# Patient Record
Sex: MALE | Race: WHITE | NOT HISPANIC OR LATINO | Employment: STUDENT | URBAN - METROPOLITAN AREA
[De-identification: names, ages, dates, MRNs, and addresses within clinical notes are randomized per-mention and may not be internally consistent; named-entity substitution may affect disease eponyms.]

---

## 2023-01-03 ENCOUNTER — CONSULT (OUTPATIENT)
Dept: GASTROENTEROLOGY | Facility: CLINIC | Age: 18
End: 2023-01-03

## 2023-01-03 ENCOUNTER — TELEPHONE (OUTPATIENT)
Dept: GASTROENTEROLOGY | Facility: CLINIC | Age: 18
End: 2023-01-03

## 2023-01-03 VITALS
HEIGHT: 71 IN | WEIGHT: 152.6 LBS | BODY MASS INDEX: 21.36 KG/M2 | TEMPERATURE: 98 F | SYSTOLIC BLOOD PRESSURE: 118 MMHG | DIASTOLIC BLOOD PRESSURE: 74 MMHG

## 2023-01-03 DIAGNOSIS — K21.9 GASTROESOPHAGEAL REFLUX DISEASE WITHOUT ESOPHAGITIS: Primary | ICD-10-CM

## 2023-01-03 DIAGNOSIS — R11.0 NAUSEA: ICD-10-CM

## 2023-01-03 DIAGNOSIS — R19.8 FULLNESS OF ABDOMEN: ICD-10-CM

## 2023-01-03 RX ORDER — CETIRIZINE HYDROCHLORIDE 10 MG/1
10 TABLET ORAL DAILY
COMMUNITY

## 2023-01-03 NOTE — PATIENT INSTRUCTIONS
Scheduled date of EGD(as of today):01/06/2023  Physician performing EGD:Dr Zenon Bird  Location of EGD:[facility]  Instructions reviewed with patient by:Jayla  Clearances:  N/A

## 2023-01-03 NOTE — H&P (VIEW-ONLY)
[facility] - Outpatient Consultation  Daryn Maier 21 y o  male MRN: [id number]  Encounter: [id number]          ASSESSMENT AND PLAN:      1  Fullness of abdomen  2  Gastroesophageal reflux disease without esophagitis  3  Nausea    120-160 lbs  Differential diagnosis for fullness may be functional versus structural  We will plan for EGD evaluation to rule out structural causes-  -it may be also metabolic to check for celiac disease and hypothyroidism  -Finally we will check gastric emptying study and right upper quadrant ultrasound  -In the future we can consider HIDA scan  -improved sleep hygiene   Fortunately weight has been overall stable but we will continue to monitor this    ______________________________________________________________________    HPI:      He is a 38-year-old male with history of nausea associated fullness and history of acid reflux disease  No prior endoscopic evaluation  No lower GI symptoms  Denies any family history of colorectal cancer  He has had 6 years of sensation of fullness associated nausea  He has recently gained weight due to weight lifting but has had difficulty in some  sustaining it  Weight has peaked at about 160 pounds currently he is 152 pounds  He has decreased appetite and sensation of fullness when she starts eating  The symptoms started 6 years ago when he started playing soccer but there is no clear association with this  He also has symptoms of acid reflux disease with breakthrough reflux symptoms once a week  He has tried Pepcid, and Tums without any clinical improvement  No prior work-up for the symptoms    Answers for HPI/ROS submitted by the patient on 1/2/2023  Chronicity: recurrent  Onset: more than 1 year ago  Onset quality: gradual  Frequency: every several days  Episode duration: 2 Hours  Progression since onset: gradually worsening  Pain location: periumbilical region  Pain - numeric: 3/10  Pain quality: a sensation of fullness  Radiates to: does not radiate  anorexia: Yes  arthralgias: No  belching: No  constipation: No  diarrhea: No  dysuria: No  fever: No  flatus: No  frequency: No  headaches: Yes  hematochezia: No  hematuria: No  melena: No  myalgias: Yes  nausea: Yes  weight loss: Yes  vomiting: No  Aggravated by: eating  Relieved by: recumbency        REVIEW OF SYSTEMS:    CONSTITUTIONAL: Denies any fever, chills, rigors, and weight loss  HEENT: No earache or tinnitus  Denies hearing loss or visual disturbances  CARDIOVASCULAR: No chest pain or palpitations  RESPIRATORY: Denies any cough, hemoptysis, shortness of breath or dyspnea on exertion  GASTROINTESTINAL: As noted in the History of Present Illness  GENITOURINARY: No problems with urination  Denies any hematuria or dysuria  NEUROLOGIC: No dizziness or vertigo, denies headaches  MUSCULOSKELETAL: Denies any muscle or joint pain  SKIN: Denies skin rashes or itching  ENDOCRINE: Denies excessive thirst  Denies intolerance to heat or cold  PSYCHOSOCIAL: Denies depression or anxiety  Denies any recent memory loss  Historical Information   History reviewed  No pertinent past medical history  History reviewed  No pertinent surgical history    Social History   Social History     Substance and Sexual Activity   Alcohol Use Never     Social History     Substance and Sexual Activity   Drug Use Never     Social History     Tobacco Use   Smoking Status Never   Smokeless Tobacco Never     Family History   Problem Relation Age of Onset   • Prostate cancer Maternal Grandfather    • Ovarian cancer Paternal Grandmother        Meds/Allergies       Current Outpatient Medications:   •  cetirizine (ZyrTEC) 10 mg tablet    Allergies   Allergen Reactions   • Dairy Aid [Tilactase] GI Intolerance   • Gluten Meal - Food Allergy GI Intolerance           Objective     Blood pressure 118/74, temperature 98 °F (36 7 °C), temperature source Tympanic, height 5' 11" (1 803 m), weight 69 2 kg (152 lb 9 6 oz)  Body mass index is 21 28 kg/m²  PHYSICAL EXAM:      General Appearance:   Alert, cooperative, no distress   HEENT:   Normocephalic, atraumatic, anicteric      Neck:  Supple, symmetrical, trachea midline   Lungs:   Clear to auscultation bilaterally; no rales, rhonchi or wheezing; respirations unlabored    Heart[de-identified]   Regular rate and rhythm; no murmur, rub, or gallop  Abdomen:   Soft, non-tender, non-distended; normal bowel sounds; no masses, no organomegaly    Genitalia:   Deferred    Rectal:   Deferred    Extremities:  No cyanosis, clubbing or edema    Pulses:  2+ and symmetric    Skin:  No jaundice, rashes, or lesions    Lymph nodes:  No palpable cervical lymphadenopathy        Lab Results:   No visits with results within 1 Day(s) from this visit  Latest known visit with results is:   No results found for any previous visit  Radiology Results:   No results found

## 2023-01-03 NOTE — PROGRESS NOTES
[facility] - Outpatient Consultation  Sahara Reeder 21 y o  male MRN: [id number]  Encounter: [id number]          ASSESSMENT AND PLAN:      1  Fullness of abdomen  2  Gastroesophageal reflux disease without esophagitis  3  Nausea    120-160 lbs  Differential diagnosis for fullness may be functional versus structural  We will plan for EGD evaluation to rule out structural causes-  -it may be also metabolic to check for celiac disease and hypothyroidism  -Finally we will check gastric emptying study and right upper quadrant ultrasound  -In the future we can consider HIDA scan  -improved sleep hygiene   Fortunately weight has been overall stable but we will continue to monitor this    ______________________________________________________________________    HPI:      He is a 47-year-old male with history of nausea associated fullness and history of acid reflux disease  No prior endoscopic evaluation  No lower GI symptoms  Denies any family history of colorectal cancer  He has had 6 years of sensation of fullness associated nausea  He has recently gained weight due to weight lifting but has had difficulty in some  sustaining it  Weight has peaked at about 160 pounds currently he is 152 pounds  He has decreased appetite and sensation of fullness when she starts eating  The symptoms started 6 years ago when he started playing soccer but there is no clear association with this  He also has symptoms of acid reflux disease with breakthrough reflux symptoms once a week  He has tried Pepcid, and Tums without any clinical improvement  No prior work-up for the symptoms    Answers for HPI/ROS submitted by the patient on 1/2/2023  Chronicity: recurrent  Onset: more than 1 year ago  Onset quality: gradual  Frequency: every several days  Episode duration: 2 Hours  Progression since onset: gradually worsening  Pain location: periumbilical region  Pain - numeric: 3/10  Pain quality: a sensation of fullness  Radiates to: does not radiate  anorexia: Yes  arthralgias: No  belching: No  constipation: No  diarrhea: No  dysuria: No  fever: No  flatus: No  frequency: No  headaches: Yes  hematochezia: No  hematuria: No  melena: No  myalgias: Yes  nausea: Yes  weight loss: Yes  vomiting: No  Aggravated by: eating  Relieved by: recumbency        REVIEW OF SYSTEMS:    CONSTITUTIONAL: Denies any fever, chills, rigors, and weight loss  HEENT: No earache or tinnitus  Denies hearing loss or visual disturbances  CARDIOVASCULAR: No chest pain or palpitations  RESPIRATORY: Denies any cough, hemoptysis, shortness of breath or dyspnea on exertion  GASTROINTESTINAL: As noted in the History of Present Illness  GENITOURINARY: No problems with urination  Denies any hematuria or dysuria  NEUROLOGIC: No dizziness or vertigo, denies headaches  MUSCULOSKELETAL: Denies any muscle or joint pain  SKIN: Denies skin rashes or itching  ENDOCRINE: Denies excessive thirst  Denies intolerance to heat or cold  PSYCHOSOCIAL: Denies depression or anxiety  Denies any recent memory loss  Historical Information   History reviewed  No pertinent past medical history  History reviewed  No pertinent surgical history    Social History   Social History     Substance and Sexual Activity   Alcohol Use Never     Social History     Substance and Sexual Activity   Drug Use Never     Social History     Tobacco Use   Smoking Status Never   Smokeless Tobacco Never     Family History   Problem Relation Age of Onset   • Prostate cancer Maternal Grandfather    • Ovarian cancer Paternal Grandmother        Meds/Allergies       Current Outpatient Medications:   •  cetirizine (ZyrTEC) 10 mg tablet    Allergies   Allergen Reactions   • Dairy Aid [Tilactase] GI Intolerance   • Gluten Meal - Food Allergy GI Intolerance           Objective     Blood pressure 118/74, temperature 98 °F (36 7 °C), temperature source Tympanic, height 5' 11" (1 803 m), weight 69 2 kg (152 lb 9 6 oz)  Body mass index is 21 28 kg/m²  PHYSICAL EXAM:      General Appearance:   Alert, cooperative, no distress   HEENT:   Normocephalic, atraumatic, anicteric      Neck:  Supple, symmetrical, trachea midline   Lungs:   Clear to auscultation bilaterally; no rales, rhonchi or wheezing; respirations unlabored    Heart[de-identified]   Regular rate and rhythm; no murmur, rub, or gallop  Abdomen:   Soft, non-tender, non-distended; normal bowel sounds; no masses, no organomegaly    Genitalia:   Deferred    Rectal:   Deferred    Extremities:  No cyanosis, clubbing or edema    Pulses:  2+ and symmetric    Skin:  No jaundice, rashes, or lesions    Lymph nodes:  No palpable cervical lymphadenopathy        Lab Results:   No visits with results within 1 Day(s) from this visit  Latest known visit with results is:   No results found for any previous visit  Radiology Results:   No results found

## 2023-01-04 ENCOUNTER — TELEPHONE (OUTPATIENT)
Dept: GASTROENTEROLOGY | Facility: AMBULATORY SURGERY CENTER | Age: 18
End: 2023-01-04

## 2023-01-05 ENCOUNTER — HOSPITAL ENCOUNTER (OUTPATIENT)
Dept: ULTRASOUND IMAGING | Facility: HOSPITAL | Age: 18
End: 2023-01-05
Attending: INTERNAL MEDICINE

## 2023-01-05 DIAGNOSIS — R11.0 NAUSEA: ICD-10-CM

## 2023-01-05 DIAGNOSIS — R19.8 FULLNESS OF ABDOMEN: ICD-10-CM

## 2023-01-06 ENCOUNTER — HOSPITAL ENCOUNTER (OUTPATIENT)
Dept: GASTROENTEROLOGY | Facility: AMBULATORY SURGERY CENTER | Age: 18
Discharge: HOME | End: 2023-01-06

## 2023-01-06 ENCOUNTER — ANESTHESIA (OUTPATIENT)
Dept: GASTROENTEROLOGY | Facility: AMBULATORY SURGERY CENTER | Age: 18
End: 2023-01-06

## 2023-01-06 ENCOUNTER — ANESTHESIA EVENT (OUTPATIENT)
Dept: GASTROENTEROLOGY | Facility: AMBULATORY SURGERY CENTER | Age: 18
End: 2023-01-06

## 2023-01-06 VITALS
OXYGEN SATURATION: 99 % | HEART RATE: 52 BPM | DIASTOLIC BLOOD PRESSURE: 77 MMHG | SYSTOLIC BLOOD PRESSURE: 130 MMHG | HEIGHT: 71 IN | WEIGHT: 152 LBS | BODY MASS INDEX: 21.28 KG/M2 | TEMPERATURE: 98.2 F | RESPIRATION RATE: 14 BRPM

## 2023-01-06 DIAGNOSIS — R11.0 NAUSEA: ICD-10-CM

## 2023-01-06 RX ORDER — LIDOCAINE HYDROCHLORIDE 20 MG/ML
INJECTION, SOLUTION EPIDURAL; INFILTRATION; INTRACAUDAL; PERINEURAL AS NEEDED
Status: DISCONTINUED | OUTPATIENT
Start: 2023-01-06 | End: 2023-01-06

## 2023-01-06 RX ORDER — SODIUM CHLORIDE, SODIUM LACTATE, POTASSIUM CHLORIDE, CALCIUM CHLORIDE 600; 310; 30; 20 MG/100ML; MG/100ML; MG/100ML; MG/100ML
50 INJECTION, SOLUTION INTRAVENOUS CONTINUOUS
Status: DISCONTINUED | OUTPATIENT
Start: 2023-01-06 | End: 2023-01-10 | Stop reason: HOSPADM

## 2023-01-06 RX ORDER — PROPOFOL 10 MG/ML
INJECTION, EMULSION INTRAVENOUS AS NEEDED
Status: DISCONTINUED | OUTPATIENT
Start: 2023-01-06 | End: 2023-01-06

## 2023-01-06 RX ADMIN — SODIUM CHLORIDE, SODIUM LACTATE, POTASSIUM CHLORIDE, CALCIUM CHLORIDE: 600; 310; 30; 20 INJECTION, SOLUTION INTRAVENOUS at 08:59

## 2023-01-06 RX ADMIN — PROPOFOL 300 MG: 10 INJECTION, EMULSION INTRAVENOUS at 09:11

## 2023-01-06 RX ADMIN — PROPOFOL 100 MG: 10 INJECTION, EMULSION INTRAVENOUS at 09:17

## 2023-01-06 RX ADMIN — PROPOFOL 50 MG: 10 INJECTION, EMULSION INTRAVENOUS at 09:14

## 2023-01-06 RX ADMIN — LIDOCAINE HYDROCHLORIDE 100 MG: 20 INJECTION, SOLUTION EPIDURAL; INFILTRATION; INTRACAUDAL; PERINEURAL at 09:11

## 2023-01-06 RX ADMIN — SODIUM CHLORIDE, SODIUM LACTATE, POTASSIUM CHLORIDE, CALCIUM CHLORIDE 50 ML/HR: 600; 310; 30; 20 INJECTION, SOLUTION INTRAVENOUS at 08:56

## 2023-01-06 RX ADMIN — PROPOFOL 50 MG: 10 INJECTION, EMULSION INTRAVENOUS at 09:16

## 2023-01-06 RX ADMIN — PROPOFOL 100 MG: 10 INJECTION, EMULSION INTRAVENOUS at 09:19

## 2023-01-06 NOTE — INTERVAL H&P NOTE
H&P reviewed  After examining the patient I find no changes in the patients condition since the H&P had been written      Vitals:    01/06/23 0844   BP: 155/74   Pulse: 77   Resp: (!) 24   Temp: 98 2 °F (36 8 °C)   SpO2: (!) 24%

## 2023-01-06 NOTE — ANESTHESIA PREPROCEDURE EVALUATION
Procedure:  EGD    Relevant Problems   GI/HEPATIC   (+) Gastroesophageal reflux disease without esophagitis             Anesthesia Plan  ASA Score- 1     Anesthesia Type- IV sedation with anesthesia with ASA Monitors  Additional Monitors:   Airway Plan:           Plan Factors-    Chart reviewed  Patient summary reviewed  Induction- intravenous  Postoperative Plan-     Informed Consent- Anesthetic plan and risks discussed with patient  I personally reviewed this patient with the CRNA  Discussed and agreed on the Anesthesia Plan with the CRNA  Karrie Nissen

## 2023-01-06 NOTE — QUICK NOTE
Procedure results reviewed with patient by Dr Edward Wise  Pt  Mother at bedside for discharge information

## 2023-01-12 ENCOUNTER — PATIENT MESSAGE (OUTPATIENT)
Dept: GASTROENTEROLOGY | Facility: CLINIC | Age: 18
End: 2023-01-12

## 2023-01-12 DIAGNOSIS — R11.0 NAUSEA: Primary | ICD-10-CM

## 2023-01-13 ENCOUNTER — PREP FOR PROCEDURE (OUTPATIENT)
Dept: GASTROENTEROLOGY | Facility: CLINIC | Age: 18
End: 2023-01-13

## 2023-01-13 DIAGNOSIS — K31.89 SUBMUCOSAL LESION OF STOMACH: Primary | ICD-10-CM

## 2023-01-14 NOTE — RESULT ENCOUNTER NOTE
I called the patient and left a voice message  No H  pylori noted mild inactive gastritis  Celiac panel negative  Patient did have an area of prominence submucosally in the antrum  Should have endoscopic ultrasound  Did review via text with Dr Pat Glasgow  We will asked staff to help arrange outpatient endoscopic ultrasound

## 2023-01-18 ENCOUNTER — TELEPHONE (OUTPATIENT)
Dept: OTHER | Facility: OTHER | Age: 18
End: 2023-01-18

## 2023-01-18 ENCOUNTER — HOSPITAL ENCOUNTER (OUTPATIENT)
Dept: NUCLEAR MEDICINE | Facility: HOSPITAL | Age: 18
Discharge: HOME | End: 2023-01-18
Attending: INTERNAL MEDICINE

## 2023-01-18 DIAGNOSIS — R11.0 NAUSEA: ICD-10-CM

## 2023-01-18 DIAGNOSIS — R19.8 FULLNESS OF ABDOMEN: ICD-10-CM

## 2023-01-18 NOTE — TELEPHONE ENCOUNTER
Patient's mother calling in for gastric emptying results  Mother made aware of note left by provider that results came back normal  Mother requesting call back to discuss next steps  Please follow up

## 2023-01-18 NOTE — TELEPHONE ENCOUNTER
Spoke with pt  Gave her info from dr Shakeel Sousa  And pt asked to have a sooner apt then April  So I scheduled her with jillian end of feb

## 2023-02-03 ENCOUNTER — TELEPHONE (OUTPATIENT)
Dept: GASTROENTEROLOGY | Facility: CLINIC | Age: 18
End: 2023-02-03

## 2023-02-03 NOTE — TELEPHONE ENCOUNTER
----- Message from Pete King MD sent at 2/3/2023  7:42 AM EST -----  Regarding: FW: Follow-Up Questions  Contact: [phone]  Can we please have the patient scheduled  Thanks   ----- Message -----  From: Hernandez Shannon MA  Sent: 2/3/2023   7:03 AM EST  To: Pete King MD  Subject: FW: Follow-Up Questions                            ----- Message -----  From: Erin Plummer  Sent: 2/2/2023   5:07 PM EST  To: , #  Subject: Claribel Darnell,  I was wondering what the status was on the endoscopic ultrasound that you discussed scheduling in your report was?   Thanks very much,  Brett Marvin

## 2023-02-16 ENCOUNTER — TELEPHONE (OUTPATIENT)
Dept: OTHER | Facility: OTHER | Age: 18
End: 2023-02-16

## 2023-02-16 NOTE — TELEPHONE ENCOUNTER
Mom is requesting at Vitamin B12 lab,TSH, 3rd generation with Free T4 reflex, Celiac Disease Panel  script be faxed to [facility] @ [phone]

## 2023-02-16 NOTE — TELEPHONE ENCOUNTER
[facility] called and stated they are not getting the fax for the B12 order   The fax number is [phone]

## 2023-02-19 LAB
ENDOMYSIUM IGA SER QL: NEGATIVE
IGA SERPL-MCNC: 86 MG/DL (ref 90–386)
TSH SERPL DL<=0.005 MIU/L-ACNC: 0.99 UIU/ML (ref 0.45–4.5)
TTG IGA SER-ACNC: <2 U/ML (ref 0–3)
TTG IGG SER-ACNC: <2 U/ML (ref 0–5)
VIT B12 SERPL-MCNC: 555 PG/ML (ref 232–1245)

## 2023-02-20 ENCOUNTER — OFFICE VISIT (OUTPATIENT)
Dept: GASTROENTEROLOGY | Facility: CLINIC | Age: 18
End: 2023-02-20

## 2023-02-20 VITALS
WEIGHT: 151 LBS | BODY MASS INDEX: 21.14 KG/M2 | SYSTOLIC BLOOD PRESSURE: 116 MMHG | TEMPERATURE: 98.1 F | DIASTOLIC BLOOD PRESSURE: 70 MMHG | HEIGHT: 71 IN

## 2023-02-20 DIAGNOSIS — R11.0 NAUSEA: ICD-10-CM

## 2023-02-20 DIAGNOSIS — K21.9 GASTROESOPHAGEAL REFLUX DISEASE WITHOUT ESOPHAGITIS: ICD-10-CM

## 2023-02-20 DIAGNOSIS — K31.89 SUBMUCOSAL LESION OF STOMACH: ICD-10-CM

## 2023-02-20 DIAGNOSIS — R19.8 FULLNESS OF ABDOMEN: Primary | ICD-10-CM

## 2023-02-20 DIAGNOSIS — R53.83 OTHER FATIGUE: ICD-10-CM

## 2023-02-20 DIAGNOSIS — K29.70 GASTRITIS WITHOUT BLEEDING, UNSPECIFIED CHRONICITY, UNSPECIFIED GASTRITIS TYPE: ICD-10-CM

## 2023-02-20 RX ORDER — OMEPRAZOLE 40 MG/1
40 CAPSULE, DELAYED RELEASE ORAL DAILY
Qty: 30 CAPSULE | Refills: 2 | Status: SHIPPED | OUTPATIENT
Start: 2023-02-20

## 2023-02-20 NOTE — PROGRESS NOTES
[facility] - Outpatient Follow-up Note  Merrily Lefort 21 y o  male MRN: [id number]  Encounter: [id number]          ASSESSMENT AND PLAN:      1  Fullness of abdomen  2  Nausea  3  GERD  4  Gastritis  He describes symptoms of dyspepsia - early satiety/fullness, epigastric discomfort after eating  Recent EGD showed gastritis and gastric submucosal lesion, biopsies negative for H pylori and celiac disease  RUQ US and gastric emptying study normal  He does not take NSAIDs  I recommend trial of PPI daily for treatment of gastritis and suspected functional dyspepsia  If symptoms do not improve, may consider TCA for functional symptoms    - omeprazole (PriLOSEC) 40 MG capsule; Take 1 capsule (40 mg total) by mouth daily 30 minutes before breakfast daily  Dispense: 30 capsule; Refill: 2    5  Submucosal lesion of stomach  EGD showed submucosal lesion or protrusion lesser curvature of the stomach about 2 cm  This was discussed with our advanced endoscopist and EUS was recommended for further evaluation    6  Other fatigue  - CBC and differential; Future    Follow-up in 2 to 3 months  ______________________________________________________________________    SUBJECTIVE: 58-year-old male presenting for follow-up of dyspepsia  He has been taking Tums as needed and Pepcid each morning with some relief of symptoms  He still feels fullness, bloating, and epigastric discomfort with eating  He does not take NSAIDs  He denies any heartburn or regurgitation  He denies abnormal weight loss  He denies diarrhea, constipation, blood in the stool  REVIEW OF SYSTEMS IS OTHERWISE NEGATIVE  Historical Information   No past medical history on file    Past Surgical History:   Procedure Laterality Date   • WISDOM TOOTH EXTRACTION       Social History   Social History     Substance and Sexual Activity   Alcohol Use Never     Social History     Substance and Sexual Activity   Drug Use Never     Social History Tobacco Use   Smoking Status Never   Smokeless Tobacco Never     Family History   Problem Relation Age of Onset   • Prostate cancer Maternal Grandfather    • Ovarian cancer Paternal Grandmother        Meds/Allergies       Current Outpatient Medications:   •  cetirizine (ZyrTEC) 10 mg tablet    Allergies   Allergen Reactions   • Dairy Aid [Tilactase] GI Intolerance   • Gluten Meal - Food Allergy GI Intolerance           Objective     There were no vitals taken for this visit  There is no height or weight on file to calculate BMI  PHYSICAL EXAM:      General Appearance:   Alert, cooperative, no distress   HEENT:   Normocephalic, atraumatic, anicteric      Neck:  Supple, symmetrical, trachea midline   Lungs:   Clear to auscultation bilaterally; no rales, rhonchi or wheezing; respirations unlabored    Heart[de-identified]   Regular rate and rhythm; no murmur, rub, or gallop  Abdomen:   Soft, epigastric tenderness, non-distended; normal bowel sounds; no masses, no organomegaly    Genitalia:   Deferred    Rectal:   Deferred    Extremities:  No cyanosis, clubbing or edema    Pulses:  2+ and symmetric    Skin:  No jaundice, rashes, or lesions    Lymph nodes:  No palpable cervical lymphadenopathy        Lab Results:   No visits with results within 1 Day(s) from this visit  Latest known visit with results is:   Orders Only on 02/16/2023   Component Date Value   • Vitamin B-12 02/16/2023 555          Radiology Results:   No results found

## 2023-02-20 NOTE — PATIENT INSTRUCTIONS
Procedure: EUS  Scheduled date of procedure (as of today): 04/06/2023  Physician performing procedure: Dr Deisy Mills   Location of procedure: [facility]  Instructions reviewed with patient by:  Lior Cooney   Clearances:  N/A

## 2023-03-16 DIAGNOSIS — K29.70 GASTRITIS WITHOUT BLEEDING, UNSPECIFIED CHRONICITY, UNSPECIFIED GASTRITIS TYPE: ICD-10-CM

## 2023-03-16 RX ORDER — OMEPRAZOLE 40 MG/1
40 CAPSULE, DELAYED RELEASE ORAL DAILY
Qty: 90 CAPSULE | Refills: 1 | Status: SHIPPED | OUTPATIENT
Start: 2023-03-16

## 2023-03-17 ENCOUNTER — DOCUMENTATION (OUTPATIENT)
Dept: GASTROENTEROLOGY | Facility: CLINIC | Age: 18
End: 2023-03-17

## 2023-03-17 ENCOUNTER — TRANSCRIBE ORDERS (OUTPATIENT)
Dept: GASTROENTEROLOGY | Facility: CLINIC | Age: 18
End: 2023-03-17

## 2023-03-31 ENCOUNTER — TELEPHONE (OUTPATIENT)
Dept: GASTROENTEROLOGY | Facility: MEDICAL CENTER | Age: 18
End: 2023-03-31

## 2023-03-31 NOTE — TELEPHONE ENCOUNTER
Left voice message for patient confirming upcoming procedure  Patient was instructed to call [phone] if they have any questions or concerns about the prep instructions or if they need to change or cancel the procedure

## 2023-04-06 ENCOUNTER — ANESTHESIA (OUTPATIENT)
Dept: GASTROENTEROLOGY | Facility: HOSPITAL | Age: 18
End: 2023-04-06

## 2023-04-06 ENCOUNTER — ANESTHESIA EVENT (OUTPATIENT)
Dept: GASTROENTEROLOGY | Facility: HOSPITAL | Age: 18
End: 2023-04-06

## 2023-04-06 ENCOUNTER — HOSPITAL ENCOUNTER (OUTPATIENT)
Dept: GASTROENTEROLOGY | Facility: HOSPITAL | Age: 18
Setting detail: OUTPATIENT SURGERY
End: 2023-04-06
Attending: INTERNAL MEDICINE

## 2023-04-06 VITALS
DIASTOLIC BLOOD PRESSURE: 62 MMHG | HEART RATE: 62 BPM | TEMPERATURE: 97.4 F | SYSTOLIC BLOOD PRESSURE: 118 MMHG | OXYGEN SATURATION: 98 % | RESPIRATION RATE: 18 BRPM

## 2023-04-06 DIAGNOSIS — K31.89 SUBMUCOSAL LESION OF STOMACH: ICD-10-CM

## 2023-04-06 RX ORDER — FENTANYL CITRATE 50 UG/ML
INJECTION, SOLUTION INTRAMUSCULAR; INTRAVENOUS AS NEEDED
Status: DISCONTINUED | OUTPATIENT
Start: 2023-04-06 | End: 2023-04-06

## 2023-04-06 RX ORDER — GLYCOPYRROLATE 0.2 MG/ML
INJECTION INTRAMUSCULAR; INTRAVENOUS AS NEEDED
Status: DISCONTINUED | OUTPATIENT
Start: 2023-04-06 | End: 2023-04-06

## 2023-04-06 RX ORDER — PROPOFOL 10 MG/ML
INJECTION, EMULSION INTRAVENOUS AS NEEDED
Status: DISCONTINUED | OUTPATIENT
Start: 2023-04-06 | End: 2023-04-06

## 2023-04-06 RX ORDER — SODIUM CHLORIDE 9 MG/ML
INJECTION, SOLUTION INTRAVENOUS CONTINUOUS PRN
Status: DISCONTINUED | OUTPATIENT
Start: 2023-04-06 | End: 2023-04-06

## 2023-04-06 RX ADMIN — PROPOFOL 30 MG: 10 INJECTION, EMULSION INTRAVENOUS at 13:52

## 2023-04-06 RX ADMIN — FENTANYL CITRATE 50 MCG: 50 INJECTION, SOLUTION INTRAMUSCULAR; INTRAVENOUS at 13:40

## 2023-04-06 RX ADMIN — SODIUM CHLORIDE: 0.9 INJECTION, SOLUTION INTRAVENOUS at 13:31

## 2023-04-06 RX ADMIN — PROPOFOL 30 MG: 10 INJECTION, EMULSION INTRAVENOUS at 13:57

## 2023-04-06 RX ADMIN — PROPOFOL 50 MG: 10 INJECTION, EMULSION INTRAVENOUS at 13:39

## 2023-04-06 RX ADMIN — PROPOFOL 200 MG: 10 INJECTION, EMULSION INTRAVENOUS at 13:33

## 2023-04-06 RX ADMIN — PROPOFOL 50 MG: 10 INJECTION, EMULSION INTRAVENOUS at 13:48

## 2023-04-06 RX ADMIN — FENTANYL CITRATE 50 MCG: 50 INJECTION, SOLUTION INTRAMUSCULAR; INTRAVENOUS at 13:32

## 2023-04-06 RX ADMIN — GLYCOPYRROLATE 0.1 MCG: 0.2 INJECTION, SOLUTION INTRAMUSCULAR; INTRAVENOUS at 13:29

## 2023-04-06 RX ADMIN — PROPOFOL 50 MG: 10 INJECTION, EMULSION INTRAVENOUS at 13:44

## 2023-04-06 RX ADMIN — PROPOFOL 100 MG: 10 INJECTION, EMULSION INTRAVENOUS at 13:35

## 2023-04-06 NOTE — ANESTHESIA POSTPROCEDURE EVALUATION
Post-Op Assessment Note    CV Status:  Stable  Pain scale: FELY  Pain management: adequate     Mental Status:  Sleepy and arousable   Hydration Status:  Euvolemic   PONV Controlled:  Controlled   Airway Patency:  Patent      Post Op Vitals Reviewed: Yes      Staff: CRNA         No notable events documented      BP (!) 99/49 (04/06/23 1403)    Temp (!) 97 4 °F (36 3 °C) (04/06/23 1403)    Pulse 57 (04/06/23 1403)   Resp 18 (04/06/23 1403)    SpO2 99 % (04/06/23 1403)

## 2023-04-06 NOTE — ANESTHESIA PREPROCEDURE EVALUATION
Procedure:  ENDOSCOPIC ULTRASOUND (UPPER)    Relevant Problems   GI/HEPATIC   (+) Gastroesophageal reflux disease without esophagitis        Physical Exam    Airway    Mallampati score: II  TM Distance: >3 FB  Neck ROM: full     Dental   No notable dental hx     Cardiovascular      Pulmonary      Other Findings        Anesthesia Plan  ASA Score- 2     Anesthesia Type- IV sedation with anesthesia with ASA Monitors  Additional Monitors:   Airway Plan:           Plan Factors-Exercise tolerance (METS): >4 METS  Chart reviewed  Imaging results reviewed  Patient summary reviewed  Patient is not a current smoker  Induction- intravenous  Postoperative Plan-     Informed Consent- Anesthetic plan and risks discussed with patient  I personally reviewed this patient with the CRNA  Discussed and agreed on the Anesthesia Plan with the CRNA  Rosaline Harris

## 2023-04-06 NOTE — H&P
History and Physical -  [facility]  Lauryn Luevano 19 y o  male MRN: [id number]    HPI: Lauryn Luevano is a 19y o  year old male who presents with gastric submucosal nodule  Review of Systems    Historical Information   No past medical history on file  Past Surgical History:   Procedure Laterality Date   • WISDOM TOOTH EXTRACTION       Social History   Social History     Substance and Sexual Activity   Alcohol Use Never     Social History     Substance and Sexual Activity   Drug Use Never     Social History     Tobacco Use   Smoking Status Never   Smokeless Tobacco Never     Family History   Problem Relation Age of Onset   • Prostate cancer Maternal Grandfather    • Ovarian cancer Paternal Grandmother        Meds/Allergies     (Not in a hospital admission)      Allergies   Allergen Reactions   • Dairy Aid [Tilactase] GI Intolerance   • Gluten Meal - Food Allergy GI Intolerance       Objective     /82   Pulse 74   Temp 97 7 °F (36 5 °C) (Tympanic)   Resp 16   SpO2 100%       PHYSICAL EXAM    Gen: NAD  CV: RRR  CHEST: Clear  ABD: soft, NT/ND  EXT: no edema  Neuro: AAO      ASSESSMENT/PLAN:  This is a 19y o  year old male here for EGD / EUS for gastric submucosal nodule       PLAN:   Procedure: EGD/ EUS

## 2023-05-11 LAB
BASOPHILS # BLD AUTO: 0.1 X10E3/UL (ref 0–0.2)
BASOPHILS NFR BLD AUTO: 2 %
EOSINOPHIL # BLD AUTO: 0.2 X10E3/UL (ref 0–0.4)
EOSINOPHIL NFR BLD AUTO: 3 %
ERYTHROCYTE [DISTWIDTH] IN BLOOD BY AUTOMATED COUNT: 11.8 % (ref 11.6–15.4)
HCT VFR BLD AUTO: 50.1 % (ref 37.5–51)
HGB BLD-MCNC: 16.8 G/DL (ref 13–17.7)
IMM GRANULOCYTES # BLD: 0 X10E3/UL (ref 0–0.1)
IMM GRANULOCYTES NFR BLD: 0 %
LYMPHOCYTES # BLD AUTO: 2.1 X10E3/UL (ref 0.7–3.1)
LYMPHOCYTES NFR BLD AUTO: 41 %
MCH RBC QN AUTO: 28.8 PG (ref 26.6–33)
MCHC RBC AUTO-ENTMCNC: 33.5 G/DL (ref 31.5–35.7)
MCV RBC AUTO: 86 FL (ref 79–97)
MONOCYTES # BLD AUTO: 0.6 X10E3/UL (ref 0.1–0.9)
MONOCYTES NFR BLD AUTO: 11 %
NEUTROPHILS # BLD AUTO: 2.2 X10E3/UL (ref 1.4–7)
NEUTROPHILS NFR BLD AUTO: 43 %
PLATELET # BLD AUTO: 310 X10E3/UL (ref 150–450)
RBC # BLD AUTO: 5.83 X10E6/UL (ref 4.14–5.8)
WBC # BLD AUTO: 5.2 X10E3/UL (ref 3.4–10.8)

## 2023-05-23 ENCOUNTER — TELEPHONE (OUTPATIENT)
Dept: GASTROENTEROLOGY | Facility: MEDICAL CENTER | Age: 18
End: 2023-05-23

## 2023-05-23 NOTE — TELEPHONE ENCOUNTER
----- Message from Seamus Murillo sent at 5/23/2023  1:04 PM EDT -----  Regarding: UPCOMING PROCEDURE  Contact: Thelma Reina, I was hoping to reschedule the appointment scheduled for tomorrow morning at 8am to a later point in time  What action would I take to cancel the appointment and reschedule it?

## 2023-05-23 NOTE — TELEPHONE ENCOUNTER
Spoke to patient and cx appt as requested by patient     Added to [employer]  -  [facility]